# Patient Record
Sex: MALE | Race: BLACK OR AFRICAN AMERICAN | Employment: FULL TIME | URBAN - METROPOLITAN AREA
[De-identification: names, ages, dates, MRNs, and addresses within clinical notes are randomized per-mention and may not be internally consistent; named-entity substitution may affect disease eponyms.]

---

## 2017-02-03 DIAGNOSIS — E11.65 UNCONTROLLED TYPE 2 DIABETES MELLITUS WITH COMPLICATION, UNSPECIFIED LONG TERM INSULIN USE STATUS: ICD-10-CM

## 2017-02-03 DIAGNOSIS — E11.8 UNCONTROLLED TYPE 2 DIABETES MELLITUS WITH COMPLICATION, UNSPECIFIED LONG TERM INSULIN USE STATUS: ICD-10-CM

## 2017-02-03 RX ORDER — INSULIN GLARGINE 100 [IU]/ML
INJECTION, SOLUTION SUBCUTANEOUS
Qty: 15 ML | Refills: 6 | Status: SHIPPED | OUTPATIENT
Start: 2017-02-03 | End: 2017-02-09 | Stop reason: CLARIF

## 2017-02-03 RX ORDER — PEN NEEDLE, DIABETIC 30 GX3/16"
NEEDLE, DISPOSABLE MISCELLANEOUS
Qty: 50 PACKAGE | Refills: 6 | Status: SHIPPED | OUTPATIENT
Start: 2017-02-03 | End: 2017-02-15 | Stop reason: SDUPTHER

## 2017-02-09 RX ORDER — INSULIN DEGLUDEC 100 U/ML
36 INJECTION, SOLUTION SUBCUTANEOUS
Qty: 15 ML | Refills: 6 | OUTPATIENT
Start: 2017-02-09

## 2017-02-09 RX ORDER — INSULIN GLARGINE 100 [IU]/ML
INJECTION, SOLUTION SUBCUTANEOUS
Qty: 15 ML | Refills: 6 | Status: SHIPPED | OUTPATIENT
Start: 2017-02-09 | End: 2018-03-28 | Stop reason: SDUPTHER

## 2017-02-09 NOTE — TELEPHONE ENCOUNTER
Patient called stating that his insulin Lantus is not covered requires prior auth. Can you change or do prior auth. Patient is out of insulin.

## 2017-02-10 ENCOUNTER — OFFICE VISIT (OUTPATIENT)
Dept: ENDOCRINOLOGY | Age: 47
End: 2017-02-10

## 2017-02-10 VITALS
WEIGHT: 185 LBS | SYSTOLIC BLOOD PRESSURE: 126 MMHG | RESPIRATION RATE: 20 BRPM | BODY MASS INDEX: 26.48 KG/M2 | OXYGEN SATURATION: 96 % | DIASTOLIC BLOOD PRESSURE: 87 MMHG | HEIGHT: 70 IN | HEART RATE: 88 BPM | TEMPERATURE: 99.3 F

## 2017-02-10 DIAGNOSIS — E78.2 MIXED HYPERLIPIDEMIA: ICD-10-CM

## 2017-02-10 DIAGNOSIS — E11.65 TYPE 2 DIABETES MELLITUS WITH HYPERGLYCEMIA, WITH LONG-TERM CURRENT USE OF INSULIN (HCC): Primary | ICD-10-CM

## 2017-02-10 DIAGNOSIS — Z79.4 TYPE 2 DIABETES MELLITUS WITH HYPERGLYCEMIA, WITH LONG-TERM CURRENT USE OF INSULIN (HCC): Primary | ICD-10-CM

## 2017-02-10 DIAGNOSIS — I10 ESSENTIAL HYPERTENSION: ICD-10-CM

## 2017-02-10 RX ORDER — INSULIN GLARGINE 100 [IU]/ML
INJECTION, SOLUTION SUBCUTANEOUS
Qty: 1 EACH | Refills: 0 | Status: SHIPPED | COMMUNITY
Start: 2017-02-10

## 2017-02-10 RX ORDER — LANCETS 28 GAUGE
EACH MISCELLANEOUS
Qty: 200 LANCET | Refills: 6 | Status: SHIPPED | OUTPATIENT
Start: 2017-02-10 | End: 2017-10-16 | Stop reason: SDUPTHER

## 2017-02-10 NOTE — MR AVS SNAPSHOT
Visit Information Date & Time Provider Department Dept. Phone Encounter #  
 2/10/2017  1:30 PM Minnie Husain MD [facility] Diabetes & Endocrinology [phone] [id number] Follow-up Instructions Return in about 2 months (around 4/10/2017). Upcoming Health Maintenance Date Due  
 FOOT EXAM Q1 2/26/1982 EYE EXAM RETINAL OR DILATED Q1 2/26/1982 Pneumococcal 19-64 Medium Risk (1 of 1 - PPSV23) 2/26/1991 DTaP/Tdap/Td series (1 - Tdap) 2/26/1993 HEMOGLOBIN A1C Q6M 7/16/2016 INFLUENZA AGE 9 TO ADULT 8/1/2016 MICROALBUMIN Q1 1/16/2017 LIPID PANEL Q1 1/16/2017 Allergies as of 2/10/2017  Review Complete On: 2/10/2017 By: Minnie Husain MD  
 No Known Allergies Current Immunizations  Never Reviewed No immunizations on file. Not reviewed this visit You Were Diagnosed With   
  
 Codes Comments Type 2 diabetes mellitus with hyperglycemia, with long-term current use of insulin (HCC)    -  Primary ICD-10-CM: E11.65, Z79.4 ICD-9-CM: 250.00, 790.29, V58.67 Essential hypertension     ICD-10-CM: I10 
ICD-9-CM: 401.9 Mixed hyperlipidemia     ICD-10-CM: E78.2 ICD-9-CM: 272.2 Vitals BP Pulse Temp Resp Height(growth percentile) Weight(growth percentile) 126/87 88 99.3 °F (37.4 °C) (Oral) 20 5' 10\" (1.778 m) 185 lb (83.9 kg) SpO2 BMI Smoking Status 96% 26.54 kg/m2 Never Smoker Vitals History BMI and BSA Data Body Mass Index Body Surface Area  
 26.54 kg/m 2 2.04 m 2 Preferred Pharmacy Pharmacy Name Phone [facility] [phone] Your Updated Medication List  
  
   
This list is accurate as of: 2/10/17  2:33 PM.  Always use your most recent med list.  
  
  
  
  
 ergocalciferol 50,000 unit capsule Commonly known as:  DRISDOL Take 1 capsule by mouth every seven (7) days. fenofibrate nanocrystallized 145 mg tablet Commonly known as:  [employer] Take 1 Tab by mouth daily. glucose blood VI test strips strip Commonly known as:  CONTOUR NEXT STRIPS Test 4 times daily, Dx. Code E11.65  
  
 insulin glargine 100 unit/mL (3 mL) pen Commonly known asDyamile Laurenan Inject 36 units at Bedtime Insulin Needles (Disposable) 31 gauge x 5/16\" Ndle Commonly known as:  BD INSULIN PEN NEEDLE UF SHORT Use once daily, Dx. Code E11.65 Lancets Misc Commonly known as:  Doloris Ambrosia Test 4 times daily, Dx. Code E11.65  
  
 * SITagliptin-metFORMIN 50-1,000 mg Tm24 Commonly known as:  JANUMET XR  
sample * SITagliptin-metFORMIN 50-1,000 mg per tablet Commonly known as:  Burnard Speaker Take 1 Tab by mouth two (2) times daily (with meals). VICODIN PO Take  by mouth as needed. * Notice: This list has 2 medication(s) that are the same as other medications prescribed for you. Read the directions carefully, and ask your doctor or other care provider to review them with you. We Performed the Following HEMOGLOBIN A1C WITH EAG [70489 CPT(R)] LIPID PANEL [84902 CPT(R)] METABOLIC PANEL, COMPREHENSIVE [75672 CPT(R)] MICROALBUMIN, UR, RAND W/ MICROALBUMIN/CREA RATIO [id number] CPT(R)] Follow-up Instructions Return in about 2 months (around 4/10/2017). Patient Instructions Check blood sugars before each meal and bedtime Continue  on janumet  50/1gm twice a day with meals Stop  Lantus Start on Basaglar 36 units at bed time Start on Apidra ( pens)  4 units before meals And add Apidra as follows for sugars 151- 200 mg   1 unit 201- 250 mg    3 units 251- 300 mg    5 units 301- 350 mg    7 units 351 - 400 mg   9 units 401-  450 mg   11 units 451 - 500 mg   13 units Do not skip meals Do not eat in between meals Reduce carbs- pasta, rice, potatoes, bread Do not drink juices or sodas WATER is YOUR best friend Do not eat peanut butter Do not eat sugar free cookies and cakes Do not eat peaches, oranges, grapes and pine apples Introducing [facility] & HEALTH SERVICES! [address] introduces cloudswave patient portal. Now you can access parts of your medical record, email your doctor's office, and request medication refills online. 1. In your internet browser, go to [email or web address] 2. Click on the First Time User? Click Here link in the Sign In box. You will see the New Member Sign Up page. 3. Enter your cloudswave Access Code exactly as it appears below. You will not need to use this code after youve completed the sign-up process. If you do not sign up before the expiration date, you must request a new code. · cloudswave Access Code: [email or web address] Expires: 5/11/2017  2:33 PM 
 
4. Enter the last four digits of your Social Security Number (xxxx) and Date of Birth (mm/dd/yyyy) as indicated and click Submit. You will be taken to the next sign-up page. 5. Create a cloudswave ID. This will be your cloudswave login ID and cannot be changed, so think of one that is secure and easy to remember. 6. Create a cloudswave password. You can change your password at any time. 7. Enter your Password Reset Question and Answer. This can be used at a later time if you forget your password. 8. Enter your e-mail address. You will receive e-mail notification when new information is available in [address]. 9. Click Sign Up. You can now view and download portions of your medical record. 10. Click the Download Summary menu link to download a portable copy of your medical information. If you have questions, please visit the Frequently Asked Questions section of the cloudswave website. Remember, cloudswave is NOT to be used for urgent needs. For medical emergencies, dial 911. Now available from your iPhone and Android! Please provide this summary of care documentation to your next provider. Your primary care clinician is listed as Tressa Miller. If you have any questions after today's visit, please call [phone].

## 2017-02-10 NOTE — PROGRESS NOTES
HISTORY OF PRESENT ILLNESS  Beata Geronimo is a 42 y.o. male. HPI  Patient here for f/u after  Last  visit for  Type 2 diabetes mellitus in feb 2016    Prior   Visits   April 2014, dec 2014, jan 2013      Accompanied by his fiance gladly    He is not getting any meds , terell excuse he has again  Never got the meter    He appears frustrated that no meds are affordable   He does not know his deductible  He has severe hyperglycemic symptoms  Lost 12 lbs      Past Medical History   Diagnosis Date    DM (diabetes mellitus) ([facility] [address]        Social History     Social History    Marital status: SINGLE     Spouse name: N/A    Number of children: N/A    Years of education: N/A     Occupational History    Not on file. Social History Main Topics    Smoking status: Never Smoker    Smokeless tobacco: Not on file    Alcohol use Yes      Comment: social    Drug use: No    Sexual activity: Not on file     Other Topics Concern    Not on file     Social History Narrative       History reviewed. No pertinent family history. Old history :.   Current A1C is 9.4 % from today and symptoms/problems include polyuria  polydipsia  paresthesias of the feet: severe     Current diabetic medications include none. Current monitoring regimen: none  Home blood sugar records: trend: fluctuating a bit  Any episodes of hypoglycemia? no    Weight trend: increasing steadily  Prior visit with dietician: no  Current diet: \"unhealthy\" diet in general  Current exercise: no regular exercise    Known diabetic complications: retinopathy, nephropathy and peripheral neuropathy  Cardiovascular risk factors: dyslipidemia, diabetes mellitus, obesity, sedentary life style, male gender, hypertension    Eye exam current (within one year): no  ROBERT: no     Review of Systems   Constitutional: Negative. HENT: Blurred vision  Eyes: Negative for pain and redness. Respiratory: Negative.     Cardiovascular: Negative for chest pain, palpitations and leg swelling. Gastrointestinal: Negative. Negative for constipation. Genitourinary: polyuria    Musculoskeletal: Negative for myalgias. Skin: Negative. Neurological: Negative. Endo/Heme/Allergies: Negative. Psychiatric/Behavioral: Negative for depression and memory loss. The patient does not have insomnia. Physical Exam   Constitutional: He is oriented to person, place, and time. He appears well-developed and well-nourished. HENT:   Head: Normocephalic. Eyes: Conjunctivae and EOM are normal. Pupils are equal, round, and reactive to light. Neck: Normal range of motion. Neck supple. No JVD present. No tracheal deviation present. No thyromegaly present. Cardiovascular: Normal rate, regular rhythm and normal heart sounds. Pulmonary/Chest: Effort normal and breath sounds normal.   Abdominal: Soft. Bowel sounds are normal.   Musculoskeletal: Normal range of motion. Lymphadenopathy:     He has no cervical adenopathy. Neurological: He is alert and oriented to person, place, and time. He has normal reflexes. Skin: Skin is warm. Psychiatric: He has a normal mood and affect. No new labs    Reviewed labs from feb 2016     ASSESSMENT and PLAN    1. Type 2 DM, poorly controlled : A1c is  Likely very high compared to  Over 11.9 %    From  Feb 2016  Compared to     8.8 %    From dec 2014 compared   To    Over 13 %  From April 2014 compared to  9.4 %     He has not f/u, gave several excuses as usual   Did nto get meds , again frustrated   So, took all the time to explain insurance issues, coverage and deductibles       resume janumet xr   Starting on basaglar and apidra by formulary  Gave him trumetrix meter and supplies    No log to go by AGAIN  Order labs     Need to do pancratic profile also    Patient is advised about checking blood sugars 3-4  times a day and maintaining log book.  The danger of having low blood sugars has been explained with inappropriate use of insulin Patient voiced understanding and using the printed instructions at home. Hypoglycemia management has been explained to the patient. 2. Hypoglycemia :  Educated on treating the hypoglycemia. Discussed insulin  use and prescribed    3. HTN : not on meds because of dehydratory state he is in  Patient is educated about importance of compliance with anti-hypertensives especially ARB/ACEI    4. Dyslipidemia :noted very high TG and got him onto tricor   He is not taking it . Patient is educated about benefits and adverse effects of statins and explained how benefits outweigh risk.     5. use of aspirin to prevent MI and TIA's discussed      Labs today      insisted on regular f/u s     F/u in 2 months    >50 % of visit time spent on  Counseling

## 2017-02-10 NOTE — PATIENT INSTRUCTIONS
Check blood sugars before each meal and bedtime     Continue  on janumet  50/1gm twice a day with meals     Stop  Lantus   Start on Basaglar 36 units at bed time     Start on Apidra ( pens)  4 units before meals   And add Apidra as follows for sugars     151- 200 mg   1 unit   201- 250 mg    3 units   251- 300 mg    5 units   301- 350 mg    7 units   351 - 400 mg   9 units   401-  450 mg   11 units   451 - 500 mg   13 units               Do not skip meals  Do not eat in between meals    Reduce carbs- pasta, rice, potatoes, bread   Do not drink juices or sodas  WATER is YOUR best friend    Do not eat peanut butter     Do not eat sugar free cookies and cakes   Do not eat peaches, oranges, grapes and pine apples

## 2017-02-10 NOTE — PROGRESS NOTES
Wt Readings from Last 3 Encounters:   02/10/17 185 lb (83.9 kg)   02/09/16 197 lb (89.4 kg)   12/30/14 203 lb (92.1 kg)     Temp Readings from Last 3 Encounters:   02/10/17 99.3 °F (37.4 °C) (Oral)   02/09/16 97.6 °F (36.4 °C) (Oral)   12/30/14 98.3 °F (36.8 °C)     BP Readings from Last 3 Encounters:   02/10/17 126/87   02/09/16 128/90   12/30/14 136/87     Pulse Readings from Last 3 Encounters:   02/10/17 88   02/09/16 78   12/30/14 74     Lab Results   Component Value Date/Time    Hemoglobin A1c (POC) 8.8 12/30/2014 01:48 PM    Hemoglobin A1c, External 11.9 01/16/2016     No Podiatry  Last Eye exam unknown

## 2017-02-11 LAB
ALBUMIN SERPL-MCNC: 4.9 G/DL (ref 3.5–5.5)
ALBUMIN/CREAT UR: 26 MG/G CREAT (ref 0–30)
ALBUMIN/GLOB SERPL: 1.8 {RATIO} (ref 1.1–2.5)
ALP SERPL-CCNC: 72 IU/L (ref 39–117)
ALT SERPL-CCNC: 26 IU/L (ref 0–44)
AST SERPL-CCNC: 16 IU/L (ref 0–40)
BILIRUB SERPL-MCNC: 1.1 MG/DL (ref 0–1.2)
BUN SERPL-MCNC: 11 MG/DL (ref 6–24)
BUN/CREAT SERPL: 9 (ref 9–20)
CALCIUM SERPL-MCNC: 9.7 MG/DL (ref 8.7–10.2)
CHLORIDE SERPL-SCNC: 92 MMOL/L (ref 96–106)
CHOLEST SERPL-MCNC: 306 MG/DL (ref 100–199)
CO2 SERPL-SCNC: 27 MMOL/L (ref 18–29)
CREAT SERPL-MCNC: 1.29 MG/DL (ref 0.76–1.27)
CREAT UR-MCNC: 117 MG/DL
EST. AVERAGE GLUCOSE BLD GHB EST-MCNC: 315 MG/DL
GLOBULIN SER CALC-MCNC: 2.7 G/DL (ref 1.5–4.5)
GLUCOSE SERPL-MCNC: 302 MG/DL (ref 65–99)
HBA1C MFR BLD: 12.6 % (ref 4.8–5.6)
HDLC SERPL-MCNC: 48 MG/DL
INTERPRETATION, 910389: NORMAL
LDLC SERPL CALC-MCNC: 192 MG/DL (ref 0–99)
Lab: NORMAL
MICROALBUMIN UR-MCNC: 30.4 UG/ML
POTASSIUM SERPL-SCNC: 4.1 MMOL/L (ref 3.5–5.2)
PROT SERPL-MCNC: 7.6 G/DL (ref 6–8.5)
SODIUM SERPL-SCNC: 138 MMOL/L (ref 134–144)
TRIGL SERPL-MCNC: 329 MG/DL (ref 0–149)
VLDLC SERPL CALC-MCNC: 66 MG/DL (ref 5–40)

## 2017-02-15 DIAGNOSIS — E11.8 UNCONTROLLED TYPE 2 DIABETES MELLITUS WITH COMPLICATION, UNSPECIFIED LONG TERM INSULIN USE STATUS: ICD-10-CM

## 2017-02-15 DIAGNOSIS — E11.65 UNCONTROLLED TYPE 2 DIABETES MELLITUS WITH COMPLICATION, UNSPECIFIED LONG TERM INSULIN USE STATUS: ICD-10-CM

## 2017-02-16 RX ORDER — PEN NEEDLE, DIABETIC 30 GX3/16"
NEEDLE, DISPOSABLE MISCELLANEOUS
Qty: 200 PACKAGE | Refills: 6 | Status: SHIPPED | OUTPATIENT
Start: 2017-02-16 | End: 2017-10-16 | Stop reason: SDUPTHER

## 2017-10-16 DIAGNOSIS — E11.8 UNCONTROLLED TYPE 2 DIABETES MELLITUS WITH COMPLICATION, UNSPECIFIED LONG TERM INSULIN USE STATUS: ICD-10-CM

## 2017-10-16 DIAGNOSIS — E11.65 UNCONTROLLED TYPE 2 DIABETES MELLITUS WITH COMPLICATION, UNSPECIFIED LONG TERM INSULIN USE STATUS: ICD-10-CM

## 2017-10-16 RX ORDER — PEN NEEDLE, DIABETIC 30 GX3/16"
NEEDLE, DISPOSABLE MISCELLANEOUS
Qty: 200 PACKAGE | Refills: 6 | Status: SHIPPED | OUTPATIENT
Start: 2017-10-16 | End: 2019-10-29 | Stop reason: SDUPTHER

## 2017-10-16 RX ORDER — LANCETS 28 GAUGE
EACH MISCELLANEOUS
Qty: 200 LANCET | Refills: 6 | Status: SHIPPED | OUTPATIENT
Start: 2017-10-16 | End: 2018-03-28 | Stop reason: SDUPTHER

## 2017-10-16 NOTE — TELEPHONE ENCOUNTER
Patient needs refill on Janumet and test strips lancets, and pen needles.  Patient has appt 1/26/2017

## 2018-01-03 ENCOUNTER — TELEPHONE (OUTPATIENT)
Dept: ENDOCRINOLOGY | Age: 48
End: 2018-01-03

## 2018-01-03 NOTE — TELEPHONE ENCOUNTER
----- Message from [facility] sent at 1/3/2018  8:48 AM EST -----  Regarding: Dr. Bette Mensah / telephone  Patient is requesting callback to follow up on labs. Patient stated has'nt had medication in a weak and starting to feel light headed.  Best contact [phone]

## 2018-01-03 NOTE — TELEPHONE ENCOUNTER
----- Message from [facility] sent at 1/3/2018  8:48 AM EST -----  Regarding: Dr. Bailee Carter / telephone  Patient is requesting callback to follow up on labs. Patient stated has'nt had medication in a weak and starting to feel light headed.  Best contact [phone]

## 2018-03-28 ENCOUNTER — OFFICE VISIT (OUTPATIENT)
Dept: ENDOCRINOLOGY | Age: 48
End: 2018-03-28

## 2018-03-28 VITALS
DIASTOLIC BLOOD PRESSURE: 86 MMHG | TEMPERATURE: 98 F | OXYGEN SATURATION: 98 % | HEART RATE: 81 BPM | SYSTOLIC BLOOD PRESSURE: 122 MMHG | WEIGHT: 186.3 LBS | RESPIRATION RATE: 18 BRPM | HEIGHT: 70 IN | BODY MASS INDEX: 26.67 KG/M2

## 2018-03-28 DIAGNOSIS — I10 ESSENTIAL HYPERTENSION: ICD-10-CM

## 2018-03-28 DIAGNOSIS — E78.2 MIXED HYPERLIPIDEMIA: ICD-10-CM

## 2018-03-28 DIAGNOSIS — E11.65 UNCONTROLLED TYPE 2 DIABETES MELLITUS WITH HYPERGLYCEMIA, WITH LONG-TERM CURRENT USE OF INSULIN (HCC): Primary | ICD-10-CM

## 2018-03-28 DIAGNOSIS — Z79.4 UNCONTROLLED TYPE 2 DIABETES MELLITUS WITH HYPERGLYCEMIA, WITH LONG-TERM CURRENT USE OF INSULIN (HCC): Primary | ICD-10-CM

## 2018-03-28 RX ORDER — LANCETS 28 GAUGE
EACH MISCELLANEOUS
Qty: 200 LANCET | Refills: 3 | Status: SHIPPED | OUTPATIENT
Start: 2018-03-28 | End: 2019-10-29 | Stop reason: SDUPTHER

## 2018-03-28 RX ORDER — INSULIN GLARGINE 100 [IU]/ML
INJECTION, SOLUTION SUBCUTANEOUS
Qty: 15 ML | Refills: 6 | Status: SHIPPED | OUTPATIENT
Start: 2018-03-28 | End: 2019-10-29 | Stop reason: SDUPTHER

## 2018-03-28 RX ORDER — INSULIN GLARGINE 100 [IU]/ML
INJECTION, SOLUTION SUBCUTANEOUS
Qty: 15 ML | Refills: 3 | Status: SHIPPED | OUTPATIENT
Start: 2018-03-28 | End: 2018-03-28 | Stop reason: CLARIF

## 2018-03-28 NOTE — MR AVS SNAPSHOT
[address] [id number] 
[phone] Patient: rTinidad Brooks MRN: [id number] [facility]:2/61/1909 Visit Information Date & Time Provider Department Dept. Phone Encounter #  
 3/28/2018 11:15 AM Shannon Shannon MD [facility] [phone] [id number] Follow-up Instructions Return in about 2 months (around 5/28/2018). Upcoming Health Maintenance Date Due  
 FOOT EXAM Q1 2/26/1982 EYE EXAM RETINAL OR DILATED Q1 2/26/1982 Pneumococcal 19-64 Medium Risk (1 of 1 - PPSV23) 2/26/1991 DTaP/Tdap/Td series (1 - Tdap) 2/26/1993 Influenza Age 5 to Adult 8/1/2017 HEMOGLOBIN A1C Q6M 8/10/2017 MICROALBUMIN Q1 2/10/2018 LIPID PANEL Q1 2/10/2018 Allergies as of 3/28/2018  Review Complete On: 3/28/2018 By: Shannon Shannon MD  
 No Known Allergies Current Immunizations  Never Reviewed No immunizations on file. Not reviewed this visit You Were Diagnosed With   
  
 Codes Comments Uncontrolled type 2 diabetes mellitus with hyperglycemia, with long-term current use of insulin (HCC)    -  Primary ICD-10-CM: E11.65, Z79.4 ICD-9-CM: 250.02, V58.67 Vitals BP Pulse Temp Resp Height(growth percentile) Weight(growth percentile) 122/86 (BP 1 Location: Right arm, BP Patient Position: Sitting) 81 98 °F (36.7 °C) (Oral) 18 5' 10\" (1.778 m) 186 lb 4.8 oz (84.5 kg) SpO2 BMI Smoking Status 98% 26.73 kg/m2 Never Smoker Vitals History BMI and BSA Data Body Mass Index Body Surface Area  
 26.73 kg/m 2 2.04 m 2 Preferred Pharmacy Pharmacy Name Phone [facility] [phone] Your Updated Medication List  
  
   
This list is accurate as of 3/28/18 12:24 PM.  Always use your most recent med list.  
  
  
  
  
 ergocalciferol 50,000 unit capsule Commonly known as:  DRISDOL Take 1 capsule by mouth every seven (7) days. fenofibrate nanocrystallized 145 mg tablet Commonly known as:  [employer] Take 1 Tab by mouth daily. glucose blood VI test strips strip Commonly known as:  TRUE METRIX GLUCOSE TEST STRIP Test 4 times daily, Dx. Code E11.65  
  
 * insulin glargine 100 unit/mL (3 mL) Inpn Commonly known as:  BASAGLAR KWIKPEN U-100 INSULIN Inject 36 units at Bedtime * insulin glargine 100 unit/mL (3 mL) Inpn Commonly known as:  BASAGLAR KWIKPEN U-100 INSULIN  
sample  
  
 insulin glulisine U-100 100 unit/mL pen Commonly known as:  APIDRA SOLOSTAR U-100 INSULIN Inject 4 units before breakfast, lunch, and dinner. Plus sliding scale Insulin Needles (Disposable) 31 gauge x 5/16\" Ndle Commonly known as:  BD INSULIN PEN NEEDLE UF SHORT Use 4 times daily, Dx. Code E11.65  
  
 lancets 28 gauge Misc Commonly known as:  Rice Kays Test 4 times daily, Dx. Code E11.65  
  
 * SITagliptin-metFORMIN 50-1,000 mg Tm24 Commonly known as:  JANUMET XR  
sample * SITagliptin-metFORMIN 50-1,000 mg per tablet Commonly known as:  [place] Yuliana Take 1 Tab by mouth two (2) times daily (with meals). VICODIN PO Take  by mouth as needed. * Notice: This list has 4 medication(s) that are the same as other medications prescribed for you. Read the directions carefully, and ask your doctor or other care provider to review them with you. We Performed the Following C-PEPTIDE [id number] CPT(R)] GLUTAMIC ACID DECARB AB [00150 CPT(R)] HEMOGLOBIN A1C WITH EAG [62180 CPT(R)] LIPID PANEL [25824 CPT(R)] METABOLIC PANEL, COMPREHENSIVE [38828 CPT(R)] MICROALBUMIN, UR, RAND W/ MICROALB/CREAT RATIO [id number] CPT(R)] Follow-up Instructions Return in about 2 months (around 5/28/2018). Patient Instructions REli-on meter ([facility] if insurance is lost ) Check blood sugars before each meal and bedtime Continue  on janumet  50/1gm twice a day with meals Basaglar 36 units at bed time OR  
NPH novolin 36 units  At bed time ( [facility] ) Apidra ( pens)  4 units before meals OR regular insulin   ( same dose from [facility] ) And add Apidra or regular  Insulin  as follows for sugars 151- 200 mg   1 unit 201- 250 mg    3 units 251- 300 mg    5 units 301- 350 mg    7 units 351 - 400 mg   9 units 401-  450 mg   11 units 451 - 500 mg   13 units Do not skip meals Do not eat in between meals Reduce carbs- pasta, rice, potatoes, bread Do not drink juices or sodas WATER is YOUR best friend Do not eat peanut butter Do not eat sugar free cookies and cakes Do not eat peaches, oranges, grapes and pine apples Introducing \[facility]\"" & HEALTH SERVICES! Sena Marr introduces [employer] patient portal. Now you can access parts of your medical record, email your doctor's office, and request medication refills online. 1. In your internet browser, go to [email or web address] 2. Click on the First Time User? Click Here link in the Sign In box. You will see the New Member Sign Up page. 3. Enter your [employer] Access Code exactly as it appears below. You will not need to use this code after youve completed the sign-up process. If you do not sign up before the expiration date, you must request a new code. · [employer] Access Code: [email or web address] Expires: 6/26/2018 12:19 PM 
 
4. Enter the last four digits of your Social Security Number (xxxx) and Date of Birth (mm/dd/yyyy) as indicated and click Submit. You will be taken to the next sign-up page. 5. Create a Physician Referral Network (PRN)t ID. This will be your [employer] login ID and cannot be changed, so think of one that is secure and easy to remember. 6. Create a [employer] password. You can change your password at any time. 7. Enter your Password Reset Question and Answer.  This can be used at a later time if you forget your password. 8. Enter your e-mail address. You will receive e-mail notification when new information is available in [address] 9. Click Sign Up. You can now view and download portions of your medical record. 10. Click the Download Summary menu link to download a portable copy of your medical information. If you have questions, please visit the Frequently Asked Questions section of the [employer] website. Remember, [employer] is NOT to be used for urgent needs. For medical emergencies, dial 911. Now available from your iPhone and Android! Please provide this summary of care documentation to your next provider. Your primary care clinician is listed as Keith Franklin. If you have any questions after today's visit, please call [phone].

## 2018-03-28 NOTE — PROGRESS NOTES
HISTORY OF PRESENT ILLNESS  Sofía Rojo is a 57 y.o. male. HPI  Patient here for f/u after  Last  visit for  Type 2 diabetes mellitus in feb 10th 2017     Prior  Visits   Feb 2016,  Feb 2017  , April 2014, dec 2014, jan 2013      F/u intermittent     [employer] he says   When he called, he was told to take [employer] insulins, but he did nto try     He had no meds except fast acting insulin   Has no meter           Old history :     Accompanied by his fiance gladly    He is not getting any meds , terell excuse he has again  Never got the meter    He appears frustrated that no meds are affordable   He does not know his deductible  He has severe hyperglycemic symptoms  Lost 12 lbs      Past Medical History:   Diagnosis Date    DM (diabetes mellitus) ([facility] [address]        Social History     Social History    Marital status: SINGLE     Spouse name: N/A    Number of children: N/A    Years of education: N/A     Occupational History    Not on file. Social History Main Topics    Smoking status: Never Smoker    Smokeless tobacco: Never Used    Alcohol use Yes      Comment: social    Drug use: No    Sexual activity: Not on file     Other Topics Concern    Not on file     Social History Narrative       History reviewed. No pertinent family history. Old history :.   Current A1C is 9.4 % from today and symptoms/problems include polyuria  polydipsia  paresthesias of the feet: severe     Current diabetic medications include none.      Current monitoring regimen: none  Home blood sugar records: trend: fluctuating a bit  Any episodes of hypoglycemia? no    Weight trend: increasing steadily  Prior visit with dietician: no  Current diet: \"unhealthy\" diet in general  Current exercise: no regular exercise    Known diabetic complications: retinopathy, nephropathy and peripheral neuropathy  Cardiovascular risk factors: dyslipidemia, diabetes mellitus, obesity, sedentary life style, male gender, hypertension    Eye exam current (within one year): no  ROBERT: no     Review of Systems   Constitutional: Negative. HENT: Blurred vision  Eyes: Negative for pain and redness. Respiratory: Negative. Cardiovascular: Negative for chest pain, palpitations and leg swelling. Gastrointestinal: Negative. Negative for constipation. Genitourinary: polyuria    Musculoskeletal: Negative for myalgias. Skin: Negative. Neurological: Negative. Endo/Heme/Allergies: Negative. Psychiatric/Behavioral: Negative for depression and memory loss. The patient does not have insomnia. Physical Exam   Constitutional: He is oriented to person, place, and time. He appears well-developed and well-nourished. HENT: TIRED   Head: Normocephalic. Eyes: Conjunctivae and EOM are normal. Pupils are equal, round, and reactive to light. BLURRED VISION  Neck: Normal range of motion. Neck supple. No JVD present. No tracheal deviation present. No thyromegaly present. Cardiovascular: Normal rate, regular rhythm and normal heart sounds. Pulmonary/Chest: Effort normal and breath sounds normal.   Abdominal: Soft. Bowel sounds are normal.   Musculoskeletal: Normal range of motion. Lymphadenopathy:     He has no cervical adenopathy. Neurological: He is alert and oriented to person, place, and time. He has normal reflexes. Skin: Skin is warm. Psychiatric: He has a normal mood and affect.      Diabetic foot exam: march 2018    Left: Reflexes dn     Vibratory sensation normal    Proprioception nd   Sharp/dull discrimination nd    Filament test normal sensation with micro filament   Pulse DP: 2+ (normal)   Pulse PT: nd   Deformities: Yes - dystrophic nails   Right: Reflexes nd   Vibratory sensation normal   Proprioception nd   Sharp/dull discrimination normal   Filament test normal sensation with micro filament   Pulse DP: 2+ (hyperdynamic)   Pulse PT: nd   Deformities: Yes - dystrophic nails          No new labs    Will order today ASSESSMENT and PLAN    1. Type 2 DM, poorly controlled : A1c is  Likely very high compared to  Over 11.9 %    From  Feb 2016  Compared to     8.8 %    From dec 2014 compared   To    Over 13 %  From April 2014 compared to  9.4 %     He has not f/u, gave several excuses as usual   Did nto get meds , again frustrated   So, took all the time to explain insurance issues, coverage and deductibles       resume janumet xr   Starting on basaglar and apidra by formulary  Gave him trumetrix meter and supplies    No log to go by AGAIN  Order labs     Need to do pancratic profile also    Patient is advised about checking blood sugars 3-4  times a day and maintaining log book. The danger of having low blood sugars has been explained with inappropriate use of insulin  Patient voiced understanding and using the printed instructions at home. Hypoglycemia management has been explained to the patient. 2. Hypoglycemia :  Educated on treating the hypoglycemia. Discussed insulin  use and prescribed    3. HTN : not on meds because of dehydratory state he is in  Patient is educated about importance of compliance with anti-hypertensives especially ARB/ACEI    4. Dyslipidemia : noted very high TG and got him onto tricor   He is not taking it . Patient is educated about benefits and adverse effects of statins and explained how benefits outweigh risk.     5. use of aspirin to prevent MI and TIA's discussed      Labs today      insisted on regular f/u s  And gave him 3 refills     F/u in 2 months    >50 % of visit time spent on  Counseling   Patient voiced understanding her plan of care

## 2018-03-28 NOTE — PATIENT INSTRUCTIONS
REli-on meter ([facility] if insurance is lost )      Check blood sugars before each meal and bedtime     Continue  on janumet  50/1gm twice a day with meals      Basaglar 36 units at bed time   OR   NPH novolin 36 units  At bed time ( [facility] )        Apidra ( pens)  4 units before meals   OR regular insulin   ( same dose from [facility] )    And add Apidra or regular  Insulin  as follows for sugars     151- 200 mg   1 unit   201- 250 mg    3 units   251- 300 mg    5 units   301- 350 mg    7 units   351 - 400 mg   9 units   401-  450 mg   11 units   451 - 500 mg   13 units               Do not skip meals  Do not eat in between meals    Reduce carbs- pasta, rice, potatoes, bread   Do not drink juices or sodas  WATER is YOUR best friend    Do not eat peanut butter     Do not eat sugar free cookies and cakes   Do not eat peaches, oranges, grapes and pine apples

## 2018-03-28 NOTE — PROGRESS NOTES
Wt Readings from Last 3 Encounters:   03/28/18 186 lb 4.8 oz (84.5 kg)   02/10/17 185 lb (83.9 kg)   02/09/16 197 lb (89.4 kg)     Temp Readings from Last 3 Encounters:   03/28/18 98 °F (36.7 °C) (Oral)   02/10/17 99.3 °F (37.4 °C) (Oral)   02/09/16 97.6 °F (36.4 °C) (Oral)     BP Readings from Last 3 Encounters:   03/28/18 122/86   02/10/17 126/87   02/09/16 128/90     Pulse Readings from Last 3 Encounters:   03/28/18 81   02/10/17 88   02/09/16 78     Lab Results   Component Value Date/Time    Hemoglobin A1c 12.6 (H) 02/10/2017 02:50 PM    Hemoglobin A1c (POC) 8.8 12/30/2014 01:48 PM    Hemoglobin A1c, External 11.9 01/16/2016     No meter or logbook today  Needs eye exam referral.

## 2018-03-29 LAB
ALBUMIN SERPL-MCNC: 4.8 G/DL (ref 3.5–5.5)
ALBUMIN/CREAT UR: 13 MG/G CREAT (ref 0–30)
ALBUMIN/GLOB SERPL: 2 {RATIO} (ref 1.2–2.2)
ALP SERPL-CCNC: 73 IU/L (ref 39–117)
ALT SERPL-CCNC: 24 IU/L (ref 0–44)
AST SERPL-CCNC: 14 IU/L (ref 0–40)
BILIRUB SERPL-MCNC: 0.8 MG/DL (ref 0–1.2)
BUN SERPL-MCNC: 11 MG/DL (ref 6–24)
BUN/CREAT SERPL: 11 (ref 9–20)
C PEPTIDE SERPL-MCNC: 1.4 NG/ML (ref 1.1–4.4)
CALCIUM SERPL-MCNC: 9.6 MG/DL (ref 8.7–10.2)
CHLORIDE SERPL-SCNC: 96 MMOL/L (ref 96–106)
CHOLEST SERPL-MCNC: 276 MG/DL (ref 100–199)
CO2 SERPL-SCNC: 28 MMOL/L (ref 18–29)
CREAT SERPL-MCNC: 0.99 MG/DL (ref 0.76–1.27)
CREAT UR-MCNC: 115.5 MG/DL
EST. AVERAGE GLUCOSE BLD GHB EST-MCNC: 318 MG/DL
GAD65 AB SER IA-ACNC: <5 U/ML (ref 0–5)
GFR SERPLBLD CREATININE-BSD FMLA CKD-EPI: 105 ML/MIN/1.73
GFR SERPLBLD CREATININE-BSD FMLA CKD-EPI: 91 ML/MIN/1.73
GLOBULIN SER CALC-MCNC: 2.4 G/DL (ref 1.5–4.5)
GLUCOSE SERPL-MCNC: 269 MG/DL (ref 65–99)
HBA1C MFR BLD: 12.7 % (ref 4.8–5.6)
HDLC SERPL-MCNC: 46 MG/DL
INTERPRETATION, 910389: NORMAL
LDLC SERPL CALC-MCNC: ABNORMAL MG/DL (ref 0–99)
Lab: NORMAL
MICROALBUMIN UR-MCNC: 15 UG/ML
POTASSIUM SERPL-SCNC: 4 MMOL/L (ref 3.5–5.2)
PROT SERPL-MCNC: 7.2 G/DL (ref 6–8.5)
SODIUM SERPL-SCNC: 139 MMOL/L (ref 134–144)
TRIGL SERPL-MCNC: 543 MG/DL (ref 0–149)
VLDLC SERPL CALC-MCNC: ABNORMAL MG/DL (ref 5–40)

## 2018-04-02 RX ORDER — ERGOCALCIFEROL 1.25 MG/1
50000 CAPSULE ORAL
Qty: 4 CAP | Refills: 6 | Status: SHIPPED | OUTPATIENT
Start: 2018-04-02

## 2018-04-02 NOTE — TELEPHONE ENCOUNTER
[id number] [facility] called stating some kind of Vitamin was suppose to be called in for patient. Patient could not tell pharmacy which one.   Please advise pharmacy

## 2019-04-05 ENCOUNTER — ED HISTORICAL/CONVERTED ENCOUNTER (OUTPATIENT)
Dept: OTHER | Age: 49
End: 2019-04-05

## 2019-06-19 ENCOUNTER — ED HISTORICAL/CONVERTED ENCOUNTER (OUTPATIENT)
Dept: OTHER | Age: 49
End: 2019-06-19

## 2019-09-09 RX ORDER — PEN NEEDLE, DIABETIC 29 G X1/2"
NEEDLE, DISPOSABLE MISCELLANEOUS
Qty: 100 PEN NEEDLE | Refills: 4 | OUTPATIENT
Start: 2019-09-09

## 2019-09-09 RX ORDER — INSULIN GLARGINE 100 [IU]/ML
INJECTION, SOLUTION SUBCUTANEOUS
Qty: 15 ML | Refills: 0 | OUTPATIENT
Start: 2019-09-09

## 2019-10-29 ENCOUNTER — OFFICE VISIT (OUTPATIENT)
Dept: ENDOCRINOLOGY | Age: 49
End: 2019-10-29

## 2019-10-29 VITALS
TEMPERATURE: 97.6 F | HEIGHT: 70 IN | BODY MASS INDEX: 25.09 KG/M2 | RESPIRATION RATE: 18 BRPM | SYSTOLIC BLOOD PRESSURE: 119 MMHG | WEIGHT: 175.3 LBS | DIASTOLIC BLOOD PRESSURE: 70 MMHG | OXYGEN SATURATION: 100 % | HEART RATE: 85 BPM

## 2019-10-29 DIAGNOSIS — Z91.199 NON-COMPLIANT PATIENT: ICD-10-CM

## 2019-10-29 DIAGNOSIS — E78.2 MIXED HYPERLIPIDEMIA: ICD-10-CM

## 2019-10-29 DIAGNOSIS — E11.65 TYPE 2 DIABETES MELLITUS WITH HYPERGLYCEMIA, WITH LONG-TERM CURRENT USE OF INSULIN (HCC): Primary | ICD-10-CM

## 2019-10-29 DIAGNOSIS — E11.65 TYPE 2 DIABETES MELLITUS WITH HYPERGLYCEMIA, WITH LONG-TERM CURRENT USE OF INSULIN (HCC): ICD-10-CM

## 2019-10-29 DIAGNOSIS — I10 ESSENTIAL HYPERTENSION: ICD-10-CM

## 2019-10-29 DIAGNOSIS — Z79.4 TYPE 2 DIABETES MELLITUS WITH HYPERGLYCEMIA, WITH LONG-TERM CURRENT USE OF INSULIN (HCC): Primary | ICD-10-CM

## 2019-10-29 DIAGNOSIS — Z79.4 TYPE 2 DIABETES MELLITUS WITH HYPERGLYCEMIA, WITH LONG-TERM CURRENT USE OF INSULIN (HCC): ICD-10-CM

## 2019-10-29 RX ORDER — BLOOD-GLUCOSE METER
EACH MISCELLANEOUS
Qty: 1 EACH | Refills: 0 | Status: SHIPPED | OUTPATIENT
Start: 2019-10-29

## 2019-10-29 RX ORDER — LANCETS 28 GAUGE
EACH MISCELLANEOUS
Qty: 200 LANCET | Refills: 3 | Status: SHIPPED | OUTPATIENT
Start: 2019-10-29

## 2019-10-29 RX ORDER — INSULIN ASPART 100 [IU]/ML
INJECTION, SOLUTION INTRAVENOUS; SUBCUTANEOUS
Qty: 15 ML | Refills: 3 | Status: SHIPPED | OUTPATIENT
Start: 2019-10-29

## 2019-10-29 RX ORDER — BLOOD-GLUCOSE METER
EACH MISCELLANEOUS
Qty: 1 EACH | Refills: 0 | Status: SHIPPED | OUTPATIENT
Start: 2019-10-29 | End: 2019-10-29 | Stop reason: SDUPTHER

## 2019-10-29 RX ORDER — PEN NEEDLE, DIABETIC 30 GX3/16"
NEEDLE, DISPOSABLE MISCELLANEOUS
Qty: 200 PACKAGE | Refills: 3 | Status: SHIPPED | OUTPATIENT
Start: 2019-10-29 | End: 2021-05-04

## 2019-10-29 RX ORDER — INSULIN GLARGINE 100 [IU]/ML
INJECTION, SOLUTION SUBCUTANEOUS
Qty: 15 ML | Refills: 3 | Status: SHIPPED | OUTPATIENT
Start: 2019-10-29

## 2019-10-29 NOTE — PROGRESS NOTES
HISTORY OF PRESENT ILLNESS  Nanette Molina is a 54 y.o. male.   HPI  Patient here for f/u after  Last  visit for  Type 2 diabetes mellitus in March 2018     A long gap again     No meter again  He says he is very busy   No labs done prior to the visit         Old history :      Prior  Visits   Feb 2017,  Feb 2016,  April 2014, dec 2014, jan 2013    F/u intermittent     [employer] he says   When he called, he was told to take [employer] insulins, but he did nto try     He had no meds except fast acting insulin   Has no meter           Old history :     Accompanied by his fiance gladly    He is not getting any meds , terell excuse he has again  Never got the meter    He appears frustrated that no meds are affordable   He does not know his deductible  He has severe hyperglycemic symptoms  Lost 12 lbs      Past Medical History:   Diagnosis Date    DM (diabetes mellitus) ([facility] [address]        Social History     Socioeconomic History    Marital status: SINGLE     Spouse name: Not on file    Number of children: Not on file    Years of education: Not on file    Highest education level: Not on file   Occupational History    Not on file   Social Needs    Financial resource strain: Not on file    Food insecurity:     Worry: Not on file     Inability: Not on file    Transportation needs:     Medical: Not on file     Non-medical: Not on file   Tobacco Use    Smoking status: Never Smoker    Smokeless tobacco: Never Used   Substance and Sexual Activity    Alcohol use: Yes     Comment: social    Drug use: No    Sexual activity: Not on file   Lifestyle    Physical activity:     Days per week: Not on file     Minutes per session: Not on file    Stress: Not on file   Relationships    Social connections:     Talks on phone: Not on file     Gets together: Not on file     Attends [employer] service: Not on file     Active member of club or organization: Not on file     Attends meetings of clubs or organizations: Not on file Relationship status: Not on file    Intimate partner violence:     Fear of current or ex partner: Not on file     Emotionally abused: Not on file     Physically abused: Not on file     Forced sexual activity: Not on file   Other Topics Concern    Not on file   Social History Narrative    Not on file       History reviewed. No pertinent family history. Old history :.   Current A1C is 9.4 % from today and symptoms/problems include polyuria  polydipsia  paresthesias of the feet: severe     Current diabetic medications include none. Current monitoring regimen: none  Home blood sugar records: trend: fluctuating a bit  Any episodes of hypoglycemia? no    Weight trend: increasing steadily  Prior visit with dietician: no  Current diet: \"unhealthy\" diet in general  Current exercise: no regular exercise    Known diabetic complications: retinopathy, nephropathy and peripheral neuropathy  Cardiovascular risk factors: dyslipidemia, diabetes mellitus, obesity, sedentary life style, male gender, hypertension    Eye exam current (within one year): no  ROBERT: no     Review of Systems   Constitutional: Negative. HENT: Blurred vision  Eyes: Negative for pain and redness. Respiratory: Negative. Cardiovascular: Negative for chest pain, palpitations and leg swelling. Gastrointestinal: Negative. Negative for constipation. Genitourinary: polyuria    Musculoskeletal: Negative for myalgias. Skin: Negative. Neurological: Negative. Endo/Heme/Allergies: Negative. Psychiatric/Behavioral: Negative for depression and memory loss. The patient does not have insomnia. Physical Exam   Constitutional: He is oriented to person, place, and time. He appears well-developed and well-nourished. HENT: TIRED   Head: Normocephalic. Eyes: Conjunctivae and EOM are normal. Pupils are equal, round, and reactive to light. BLURRED VISION  Neck: Normal range of motion. Neck supple. No JVD present.  No tracheal deviation present. No thyromegaly present. Cardiovascular: Normal rate, regular rhythm and normal heart sounds. Pulmonary/Chest: Effort normal and breath sounds normal.   Abdominal: Soft. Bowel sounds are normal.   Musculoskeletal: Normal range of motion. Lymphadenopathy:     He has no cervical adenopathy. Neurological: He is alert and oriented to person, place, and time. He has normal reflexes. Skin: Skin is warm. Psychiatric: He has a normal mood and affect. Diabetic foot exam: march 2018    Left: Reflexes dn     Vibratory sensation normal    Proprioception nd   Sharp/dull discrimination nd    Filament test normal sensation with micro filament   Pulse DP: 2+ (normal)   Pulse PT: nd   Deformities: Yes - dystrophic nails   Right: Reflexes nd   Vibratory sensation normal   Proprioception nd   Sharp/dull discrimination normal   Filament test normal sensation with micro filament   Pulse DP: 2+ (hyperdynamic)   Pulse PT: nd   Deformities: Yes - dystrophic nails          No new labs    Will order today         ASSESSMENT and PLAN    1.  Type 2 DM, poorly controlled : A1c is  Likely very high compared to  Over 11.9 %    From  Feb 2016  Compared to     8.8 %    From dec 2014 compared   To    Over 13 %  From April 2014 compared to  9.4 %     He has not f/u again and again , gave several excuses as usual   Did nto get meds REFILLED BY ME  , NOT  frustrated  THIS TIME   He has work as an excuse this time     ON  janumet xr   Continue basal bolus regimen   Gave him [employer] meter and supplies, LAST VISIT AND HE DID NOT BRING IT THIS TIME     No log   Or labs  to go by AGAIN this visit also   Unable to assess glycemic control       He wants me to do \" refill \" of meds   Does not want  To be counseled WHEN I TRIED TO TELL HIM HOW DM CAN HURT BODY ORGANS- STOPPED ME ABRUPTLY   Order labs     Need to do pancratic profile also    Patient is advised about checking blood sugars 3-4  times a day and maintaining log book. The danger of having low blood sugars has been explained with inappropriate use of insulin  Patient voiced understanding and using the printed instructions at home. Hypoglycemia management has been explained to the patient. 2. Hypoglycemia :  Educated on treating the hypoglycemia. Discussed insulin  use and prescribed    3. HTN : not on meds because of dehydratory state he is in  Patient is educated about importance of compliance with anti-hypertensives especially ARB/ACEI    4. Dyslipidemia : noted very high TG and got him onto tricor   He is not taking it . Patient is educated about benefits and adverse effects of statins and explained how benefits outweigh risk.     5. use of aspirin to prevent MI and TIA's discussed      Labs today      insisted on regular f/u s  And gave him 3 refills ONLY     F/u ADVISED ONLY IF HE IS COMPLIANT AND GENUINELY INTERESTED IN  GETTING DIABETES CARE   HE WANTED TO SEE ANOTHER DOC         >50 % of visit time spent on  Counseling   Patient voiced understanding her plan of care

## 2019-10-29 NOTE — PROGRESS NOTES
Room 3    Identified pt with two pt identifiers(name and ). Reviewed record in preparation for visit and have obtained necessary documentation. All patient medications has been reviewed. Chief Complaint   Patient presents with    Diabetes    Diabetic Foot Exam    Medication Refill     meter       Health Maintenance Due   Topic    Pneumococcal 0-64 years (1 of 1 - PPSV23)    EYE EXAM RETINAL OR DILATED     DTaP/Tdap/Td series (1 - Tdap)    HEMOGLOBIN A1C Q6M     FOOT EXAM Q1     MICROALBUMIN Q1     LIPID PANEL Q1     Influenza Age 5 to Adult        Vitals:    10/29/19 1326   BP: 119/70   Pulse: 85   Resp: 18   Temp: 97.6 °F (36.4 °C)   TempSrc: Oral   SpO2: 100%   Weight: 175 lb 4.8 oz (79.5 kg)   Height: 5' 10\" (1.778 m)   PainSc:   0 - No pain       Wt Readings from Last 3 Encounters:   10/29/19 175 lb 4.8 oz (79.5 kg)   18 186 lb 4.8 oz (84.5 kg)   02/10/17 185 lb (83.9 kg)     Temp Readings from Last 3 Encounters:   10/29/19 97.6 °F (36.4 °C) (Oral)   18 98 °F (36.7 °C) (Oral)   02/10/17 99.3 °F (37.4 °C) (Oral)     BP Readings from Last 3 Encounters:   10/29/19 119/70   18 122/86   02/10/17 126/87     Pulse Readings from Last 3 Encounters:   10/29/19 85   18 81   02/10/17 88       Lab Results   Component Value Date/Time    Hemoglobin A1c 12.7 (H) 2018 12:28 PM    Hemoglobin A1c (POC) 8.8 2014 01:48 PM    Hemoglobin A1c, External 11.9 2016       Coordination of Care Questionnaire:   1) Have you been to an emergency room, urgent care, or hospitalized since your last visit?   no       2. Have seen or consulted any other health care provider since your last visit? NO    3) Do you have an Advanced Directive/ Living Will in place?  NO  If yes, do we have a copy on file NO  If no, would you like information NO    Patient is accompanied by self I have received verbal consent from Jersey Quezada to discuss any/all medical information while they are present in the room.

## 2019-10-29 NOTE — PATIENT INSTRUCTIONS
3 MONTHS LONG PRESCRIPTIONS         REli-on meter ([facility] if insurance is lost )  OR   TRUEMETRIX       Check blood sugars before each meal and bedtime     Continue  on janumet  50/1gm twice a day with meals  ( WILL REFILL ONLY  AFTER LABS )     LANTUS  36 units at bed time   OR   NPH novolin 36 units  At bed time ( [facility] )      NOVOLOG  4 units before meals   OR regular insulin   ( same dose from [facility] )    And add NOVOLOG   Insulin  as follows for sugars     151- 200 mg   1 unit   201- 250 mg    3 units   251- 300 mg    5 units   301- 350 mg    7 units   351 - 400 mg   9 units   401-  450 mg   11 units   451 - 500 mg   13 units               Do not skip meals  Do not eat in between meals    Reduce carbs- pasta, rice, potatoes, bread   Do not drink juices or sodas  WATER is YOUR best friend    Do not eat peanut butter     Do not eat sugar free cookies and cakes   Do not eat peaches, oranges, grapes and pine apples

## 2019-10-29 NOTE — LETTER
10/30/19 Patient: Gloria Novak YOB: 1970 Date of Visit: 10/29/2019 Wilfred Yoder MD 
[address] [address] VIA Facsimile: [phone] Dear Wilfred Yoder MD, Thank you for referring Mr. Luis Billy to [address] [place] [address] for evaluation. My notes for this consultation are attached. If you have questions, please do not hesitate to call me. I look forward to following your patient along with you. Sincerely, Nomi Greene MD

## 2019-10-30 LAB
ALBUMIN SERPL-MCNC: 4.7 G/DL (ref 3.5–5.5)
ALBUMIN/CREAT UR: 7.4 MG/G CREAT (ref 0–30)
ALBUMIN/GLOB SERPL: 2.1 {RATIO} (ref 1.2–2.2)
ALP SERPL-CCNC: 58 IU/L (ref 39–117)
ALT SERPL-CCNC: 15 IU/L (ref 0–44)
AST SERPL-CCNC: 12 IU/L (ref 0–40)
BILIRUB SERPL-MCNC: 0.7 MG/DL (ref 0–1.2)
BUN SERPL-MCNC: 13 MG/DL (ref 6–24)
BUN/CREAT SERPL: 14 (ref 9–20)
CALCIUM SERPL-MCNC: 9.8 MG/DL (ref 8.7–10.2)
CHLORIDE SERPL-SCNC: 102 MMOL/L (ref 96–106)
CHOLEST SERPL-MCNC: 214 MG/DL (ref 100–199)
CO2 SERPL-SCNC: 28 MMOL/L (ref 20–29)
CREAT SERPL-MCNC: 0.9 MG/DL (ref 0.76–1.27)
CREAT UR-MCNC: 140.6 MG/DL
EST. AVERAGE GLUCOSE BLD GHB EST-MCNC: 169 MG/DL
GLOBULIN SER CALC-MCNC: 2.2 G/DL (ref 1.5–4.5)
GLUCOSE SERPL-MCNC: 139 MG/DL (ref 65–99)
HBA1C MFR BLD: 7.5 % (ref 4.8–5.6)
HDLC SERPL-MCNC: 51 MG/DL
INTERPRETATION, 910389: NORMAL
LDLC SERPL CALC-MCNC: 118 MG/DL (ref 0–99)
Lab: NORMAL
MICROALBUMIN UR-MCNC: 10.4 UG/ML
POTASSIUM SERPL-SCNC: 4 MMOL/L (ref 3.5–5.2)
PROT SERPL-MCNC: 6.9 G/DL (ref 6–8.5)
SODIUM SERPL-SCNC: 143 MMOL/L (ref 134–144)
TRIGL SERPL-MCNC: 225 MG/DL (ref 0–149)
VLDLC SERPL CALC-MCNC: 45 MG/DL (ref 5–40)

## 2019-10-31 ENCOUNTER — TELEPHONE (OUTPATIENT)
Dept: ENDOCRINOLOGY | Age: 49
End: 2019-10-31

## 2019-10-31 NOTE — TELEPHONE ENCOUNTER
----- Message from Trell Christy sent at 10/31/2019  1:18 PM EDT -----  Regarding: Dr. Gage Sandoval  Patient return call    Caller's first and last name and relationship (if not the patient):      Best contact number(s):  [phone]    Whose call is being returned:  nurse    Details to clarify the request:      Trell Christy

## 2019-10-31 NOTE — TELEPHONE ENCOUNTER
Informed pt    Notes recorded by Cheryle Jane MD on 10/30/2019 at 5:40 PM EDT  He has done better on a1c   lipids are high

## 2020-02-18 ENCOUNTER — ED HISTORICAL/CONVERTED ENCOUNTER (OUTPATIENT)
Dept: OTHER | Age: 50
End: 2020-02-18

## 2021-02-02 ENCOUNTER — HOSPITAL ENCOUNTER (EMERGENCY)
Age: 51
Discharge: HOME | End: 2021-02-02
Attending: EMERGENCY MEDICINE | Admitting: EMERGENCY MEDICINE
Payer: MEDICAID

## 2021-02-02 VITALS
WEIGHT: 176.2 LBS | SYSTOLIC BLOOD PRESSURE: 154 MMHG | HEART RATE: 84 BPM | OXYGEN SATURATION: 98 % | DIASTOLIC BLOOD PRESSURE: 89 MMHG | HEIGHT: 71 IN | TEMPERATURE: 98.1 F | RESPIRATION RATE: 18 BRPM | BODY MASS INDEX: 24.67 KG/M2

## 2021-02-02 DIAGNOSIS — R31.9 URINARY TRACT INFECTION WITH HEMATURIA, SITE UNSPECIFIED: Primary | ICD-10-CM

## 2021-02-02 DIAGNOSIS — N39.0 URINARY TRACT INFECTION WITH HEMATURIA, SITE UNSPECIFIED: Primary | ICD-10-CM

## 2021-02-02 LAB
APPEARANCE UR: CLEAR
BACTERIA URNS QL MICRO: ABNORMAL /HPF
BILIRUB UR QL: NEGATIVE
COLOR UR: YELLOW
EPITH CASTS URNS QL MICRO: ABNORMAL /LPF
GLUCOSE UR STRIP.AUTO-MCNC: >1000 MG/DL
HGB UR QL STRIP: ABNORMAL
KETONES UR QL STRIP.AUTO: NEGATIVE MG/DL
LEUKOCYTE ESTERASE UR QL STRIP.AUTO: ABNORMAL
NITRITE UR QL STRIP.AUTO: NEGATIVE
PH UR STRIP: 5 [PH] (ref 5–8)
PROT UR STRIP-MCNC: NEGATIVE MG/DL
RBC #/AREA URNS HPF: ABNORMAL /HPF (ref 0–5)
SP GR UR REFRACTOMETRY: 1.02 (ref 1–1.03)
UROBILINOGEN UR QL STRIP.AUTO: 0.1 EU/DL (ref 0.2–1)
WBC URNS QL MICRO: ABNORMAL /HPF (ref 0–4)

## 2021-02-02 PROCEDURE — 74011250637 HC RX REV CODE- 250/637: Performed by: EMERGENCY MEDICINE

## 2021-02-02 PROCEDURE — 81001 URINALYSIS AUTO W/SCOPE: CPT

## 2021-02-02 PROCEDURE — 99283 EMERGENCY DEPT VISIT LOW MDM: CPT

## 2021-02-02 RX ORDER — DOXYCYCLINE 100 MG/1
100 CAPSULE ORAL 2 TIMES DAILY
Qty: 14 CAP | Refills: 0 | Status: SHIPPED | OUTPATIENT
Start: 2021-02-02

## 2021-02-02 RX ORDER — DOXYCYCLINE 100 MG/1
100 CAPSULE ORAL ONCE
Status: COMPLETED | OUTPATIENT
Start: 2021-02-02 | End: 2021-02-02

## 2021-02-02 RX ADMIN — DOXYCYCLINE HYCLATE 100 MG: 100 CAPSULE ORAL at 20:29

## 2021-02-03 NOTE — ED TRIAGE NOTES
Pt reports noticing urine was blood tinged 4 days ago; denies N/V/D    Pt also c/o swelling to right flank area, denies pain

## 2021-02-03 NOTE — ED PROVIDER NOTES
EMERGENCY DEPARTMENT HISTORY AND PHYSICAL EXAM      Date: 2/2/2021  Patient Name: Jammie Pearson    History of Presenting Illness     Chief Complaint   Patient presents with    Blood in Urine    Abdominal Pain       History Provided By: Patient    HPI: Jammie Pearson, 52 y.o. male   presents to the ED with cc of blood in the urine. Patient complains of seeing \"pinkish urine\" last several days without dysuria or frequency or urgency. Patient complains of mild lower abdominal bloating for last several days. Patient has a history of insulin-dependent diabetes and states that his glucose has been under control. No polydipsia or polyuria. No nausea vomiting. No fever chills. Patient is sexually active and denies obvious exposure to STD. PCP: Willian Velazquez MD    No current facility-administered medications on file prior to encounter. Current Outpatient Medications on File Prior to Encounter   Medication Sig Dispense Refill    Insulin Needles, Disposable, (BD ULTRA-FINE SHORT PEN NEEDLE) 31 gauge x 5/16\" ndle Use 4 times daily, Dx. Code E11.65 200 Package 3    lancets (TRUEPLUS LANCETS) 28 gauge misc Test 4 times daily, Dx. Code E11.65 200 Lancet 3    glucose blood VI test strips (TRUE METRIX GLUCOSE TEST STRIP) strip Test 4 times daily, Dx. Code E11.65 200 Strip 3    Blood-Glucose Meter (TRUE METRIX GLUCOSE METER) misc Use to test 4 times daily. E11.65 1 Each 0    SITagliptin-metFORMIN (JANUMET) 50-1,000 mg per tablet Take 1 Tab by mouth two (2) times daily (with meals). 60 Tab 3    insulin glargine (LANTUS SOLOSTAR U-100 INSULIN) 100 unit/mL (3 mL) inpn Inject 36 units at Bedtime; STOP BASAGLAR 15 mL 3    insulin aspart U-100 (NOVOLOG) 100 unit/mL (3 mL) inpn Inject 4 units before breakfast, lunch and dinner. Plus sliding scale to max 51 units daily. 15 mL 3    CLICKFINE 31 gauge x 2/6\" ndle Use 4 times daily, Dx.  Code E11.65 100 Pen Needle 6    ergocalciferol (DRISDOL) 50,000 unit capsule Take 1 Cap by mouth every seven (7) days. 4 Cap 6    insulin glulisine U-100 (APIDRA SOLOSTAR U-100 INSULIN) 100 unit/mL pen Inject 4 units before breakfast, lunch, and dinner. Plus sliding scale 15 mL 3    insulin glargine (BASAGLAR KWIKPEN) 100 unit/mL (3 mL) pen sample 1 Each 0    fenofibrate nanocrystallized (TRICOR) 145 mg tablet Take 1 Tab by mouth daily. 30 Tab 6    sitagliptin-metformin (JANUMET XR) 50-1,000 mg TM24 sample 28 Tab 0    HYDROCODONE BIT/ACETAMINOPHEN (VICODIN PO) Take  by mouth as needed. Past History     Past Medical History:  Past Medical History:   Diagnosis Date    DM (diabetes mellitus) ([facility] [address]     Hypertension        Past Surgical History:  History reviewed. No pertinent surgical history. Family History:  History reviewed. No pertinent family history. Social History:  Social History     Tobacco Use    Smoking status: Never Smoker    Smokeless tobacco: Never Used   Substance Use Topics    Alcohol use: Yes     Comment: social    Drug use: No       Allergies:  No Known Allergies      Review of Systems   Review of Systems   Constitutional: Negative for chills, fever and unexpected weight change. HENT: Negative for sore throat. Eyes: Negative for visual disturbance. Respiratory: Negative for cough and shortness of breath. Cardiovascular: Negative for chest pain. Gastrointestinal: Negative for abdominal pain and vomiting. Endocrine: Negative for polyuria. Genitourinary: Negative for difficulty urinating. Musculoskeletal: Negative for arthralgias. Skin: Negative for rash. Neurological: Negative for headaches. Psychiatric/Behavioral: Negative for behavioral problems. All other systems reviewed and are negative. Physical Exam   Physical Exam  Vitals signs and nursing note reviewed. Constitutional:       Appearance: Normal appearance. HENT:      Head: Normocephalic and atraumatic. Nose: No congestion.       Mouth/Throat:      Mouth: Mucous membranes are moist.   Eyes:      General: No scleral icterus. Conjunctiva/sclera: Conjunctivae normal.   Neck:      Musculoskeletal: Neck supple. Cardiovascular:      Rate and Rhythm: Normal rate and regular rhythm. Heart sounds: Normal heart sounds. Pulmonary:      Effort: Pulmonary effort is normal.      Breath sounds: Normal breath sounds. Abdominal:      General: Abdomen is flat. Bowel sounds are normal.      Palpations: Abdomen is soft. Musculoskeletal:         General: No swelling. Right lower leg: No edema. Left lower leg: No edema. Skin:     General: Skin is warm and dry. Neurological:      General: No focal deficit present. Mental Status: He is alert. Psychiatric:         Mood and Affect: Mood normal.         Diagnostic Study Results     Labs -     Recent Results (from the past 12 hour(s))   URINALYSIS W/ RFLX MICROSCOPIC    Collection Time: 02/02/21  7:15 PM   Result Value Ref Range    Color Yellow      Appearance Clear Clear      Specific gravity 1.020 1.003 - 1.030      pH (UA) 5.0 5.0 - 8.0      Protein Negative Negative mg/dL    Glucose >1,000 (A) Negative mg/dL    Ketone Negative Negative mg/dL    Bilirubin Negative Negative      Blood Large (A) Negative      Urobilinogen 0.1 (L) 0.2 - 1.0 EU/dL    Nitrites Negative Negative      Leukocyte Esterase Large (A) Negative     URINE MICROSCOPIC    Collection Time: 02/02/21  7:15 PM   Result Value Ref Range    WBC 20-50 0 - 4 /hpf    RBC 20-50 0 - 5 /hpf    Epithelial cells Few Few /lpf    Bacteria 1+ (A) Negative /hpf       Radiologic Studies -   No orders to display     CT Results  (Last 48 hours)    None        CXR Results  (Last 48 hours)    None            Medical Decision Making   I am the first provider for this patient. I reviewed the vital signs, available nursing notes, past medical history, past surgical history, family history and social history.     Vital Signs-Reviewed the patient's vital signs. Patient Vitals for the past 12 hrs:   Temp Pulse Resp BP SpO2   02/02/21 1937 98.1 °F (36.7 °C) 84 18 (!) 154/89 98 %       Records Reviewed:     Provider Notes (Medical Decision Making):       ED Course:   Initial assessment performed. The patients presenting problems have been discussed, and they are in agreement with the care plan formulated and outlined with them. I have encouraged them to ask questions as they arise throughout their visit. PROCEDURES      Disposition: Condition stable   DC- Adult Discharges: All of the diagnostic tests were reviewed and questions answered. Diagnosis, care plan and treatment options were discussed. understand instructions and will follow up as directed. The patients results have been reviewed with them. They have been counseled regarding their diagnosis. The patient verbally convey understanding and agreement of the signs, symptoms, diagnosis, treatment and prognosis and additionally agrees to follow up as recommended. They also agree with the care-plan and convey that all of their questions have been answered. I have also put together some discharge instructions for them that include: 1) educational information regarding their diagnosis, 2) how to care for their diagnosis at home, as well a 3) list of reasons why they would want to return to the ED prior to their follow-up appointment, should their condition change. PLAN:  1. Current Discharge Medication List      START taking these medications    Details   doxycycline (VIBRAMYCIN) 100 mg capsule Take 1 Cap by mouth two (2) times a day. Qty: 14 Cap, Refills: 0           2. Follow-up Information     Follow up With Specialties Details Why Contact Info    Charley Hood MD Internal Medicine Schedule an appointment as soon as possible for a visit   [address] [place] [address]  [phone]          Return to ED if worse     Diagnosis     Clinical Impression:   1.  Urinary tract infection with hematuria, site unspecified        Please note that this dictation was completed with [employer], the computer voice recognition software. Quite often unanticipated grammatical, syntax, homophones, and other interpretive errors are inadvertently transcribed by the computer software. Please disregard these errors. Please excuse any errors that have escaped final proofreading. Thank you.

## 2021-05-04 RX ORDER — PEN NEEDLE, DIABETIC 30 GX3/16"
NEEDLE, DISPOSABLE MISCELLANEOUS
Qty: 1 PACKAGE | Refills: 4 | Status: SHIPPED | OUTPATIENT
Start: 2021-05-04

## 2021-05-04 NOTE — TELEPHONE ENCOUNTER
PCP: Juan Luis Ware MD    Last appt: 10/29/2019  No future appointments.     Requested Prescriptions     Pending Prescriptions Disp Refills    Insulin Needles, Disposable, 31 gauge x 5/16\" ndle [Pharmacy Med Name: Sylvie Meehan X 9/61\" NEEDLES] 1 Package 4     Sig: USE FOUR TIMES A DAY

## 2022-03-18 PROBLEM — E78.2 MIXED HYPERLIPIDEMIA: Status: ACTIVE | Noted: 2017-02-10

## 2022-03-19 PROBLEM — Z79.4 TYPE 2 DIABETES MELLITUS WITH HYPERGLYCEMIA, WITH LONG-TERM CURRENT USE OF INSULIN (HCC): Status: ACTIVE | Noted: 2017-02-10

## 2022-03-19 PROBLEM — I10 ESSENTIAL HYPERTENSION: Status: ACTIVE | Noted: 2017-02-10

## 2022-03-19 PROBLEM — E11.65 TYPE 2 DIABETES MELLITUS WITH HYPERGLYCEMIA, WITH LONG-TERM CURRENT USE OF INSULIN (HCC): Status: ACTIVE | Noted: 2017-02-10

## 2023-05-21 RX ORDER — INSULIN GLARGINE 100 [IU]/ML
INJECTION, SOLUTION SUBCUTANEOUS
COMMUNITY
Start: 2017-02-10

## 2023-05-21 RX ORDER — ERGOCALCIFEROL 1.25 MG/1
50000 CAPSULE ORAL
COMMUNITY
Start: 2018-04-02

## 2023-05-21 RX ORDER — DOXYCYCLINE HYCLATE 100 MG/1
100 CAPSULE ORAL 2 TIMES DAILY
COMMUNITY
Start: 2021-02-02

## 2023-05-21 RX ORDER — FENOFIBRATE 145 MG/1
145 TABLET, COATED ORAL DAILY
COMMUNITY
Start: 2016-11-04